# Patient Record
Sex: MALE | Race: WHITE | URBAN - METROPOLITAN AREA
[De-identification: names, ages, dates, MRNs, and addresses within clinical notes are randomized per-mention and may not be internally consistent; named-entity substitution may affect disease eponyms.]

---

## 2017-03-02 ENCOUNTER — OFFICE VISIT (OUTPATIENT)
Dept: FAMILY MEDICINE | Facility: CLINIC | Age: 52
End: 2017-03-02
Payer: COMMERCIAL

## 2017-03-02 VITALS
OXYGEN SATURATION: 97 % | WEIGHT: 180.06 LBS | HEIGHT: 75 IN | HEART RATE: 110 BPM | TEMPERATURE: 99 F | SYSTOLIC BLOOD PRESSURE: 126 MMHG | DIASTOLIC BLOOD PRESSURE: 78 MMHG | BODY MASS INDEX: 22.39 KG/M2

## 2017-03-02 DIAGNOSIS — K62.9 RECTAL LESION: Primary | ICD-10-CM

## 2017-03-02 DIAGNOSIS — Z12.11 SPECIAL SCREENING FOR MALIGNANT NEOPLASMS, COLON: ICD-10-CM

## 2017-03-02 PROCEDURE — 99213 OFFICE O/P EST LOW 20 MIN: CPT | Performed by: PHYSICIAN ASSISTANT

## 2017-03-02 NOTE — PROGRESS NOTES
SUBJECTIVE:                                                    Raphael Frazier is a 51 year old male who presents to clinic today for the following health issues:      Rectal problem      Duration: yesterday     Description:   Pain: YES-   Itching: prior to seeing blood doyle was itching     Accompanying signs and symptoms:   Blood in stool: YES  Changes in stool pattern: no     History (similar episodes/previous evaluation): None    Precipitating or alleviating factors: None    Therapies tried and outcome: preparation H for itching    All.TAMELA Vick          Problem list and histories reviewed & adjusted, as indicated.  Additional history: 50 y/o new to me male here with some rectal c/o.  He noticed some mild rectal pain and itch.  Was not bad, was just thinking it may have been hemorrhoid.  He did have a BM yesterday, where he did have some blood with wiping.  Pain has improved since then.  No blood in stool or in bowl, just on TP.  Physically he feels much better, he is mostly worried and anxious.      He does tend to sit on toilet longer than he need.  But no recent constipation.      He has never had any colorectal screening.  No colon issues in the family.      Patient Active Problem List   Diagnosis     CARDIOVASCULAR SCREENING; LDL GOAL LESS THAN 160     Tinea versicolor     Anxiety     History reviewed. No pertinent past surgical history.    Social History   Substance Use Topics     Smoking status: Never Smoker     Smokeless tobacco: Never Used     Alcohol use No     Family History   Problem Relation Age of Onset     Hypertension Father      Psychotic Disorder Father      Asthma Father      Hypertension Brother      Asthma Mother            Reviewed and updated as needed this visit by clinical staff       Reviewed and updated as needed this visit by Provider         ROS:  Constitutional, HEENT, cardiovascular, pulmonary, gi and gu systems are negative, except as otherwise noted.    OBJECTIVE:                "                                     /78 (BP Location: Right arm, Patient Position: Right side, Cuff Size: Adult Regular)  Pulse 110  Temp 99  F (37.2  C) (Tympanic)  Ht 6' 2.5\" (1.892 m)  Wt 180 lb 1 oz (81.7 kg)  SpO2 97%  BMI 22.81 kg/m2  Body mass index is 22.81 kg/(m^2).  GENERAL: alert and no distress  EYES: Eyes grossly normal to inspection  RESP: lungs clear to auscultation - no rales, rhonchi or wheezes  CV: regular rate and rhythm, normal S1 S2, no S3 or S4, no murmur, click or rub, no peripheral edema and peripheral pulses strong  RECTAL (male): small rectal lesion 11 o'clock.  There is pinpoint bleeding from.      Diagnostic Test Results:  none      ASSESSMENT/PLAN:                                                            1. Rectal lesion  Small bleeding lesion, differential includes hemorrhoid disease.  If this bleeding continues for the next couple of days, I would like him to follow up with colorectal for further evaluation.  - COLORECTAL SURGERY REFERRAL    2. Special screening for malignant neoplasms, colon  Patient is due for colonoscopy, if symptoms resolve, should get further screening done.  If symptoms persist or worsen, follow with colorectal.    - GASTROENTEROLOGY ADULT REF PROCEDURE ONLY    Patient is due for well exam, did encourage him to follow up with that later this year.    Tyrese Bailey PA-C  [facility]    "

## 2017-03-02 NOTE — NURSING NOTE
"No chief complaint on file.    /78 (BP Location: Right arm, Patient Position: Right side, Cuff Size: Adult Regular)  Pulse 110  Temp 99  F (37.2  C) (Tympanic)  Ht 6' 2.5\" (1.892 m)  Wt 180 lb 1 oz (81.7 kg)  SpO2 97%  BMI 22.81 kg/m2 Estimated body mass index is 22.81 kg/(m^2) as calculated from the following:    Height as of this encounter: 6' 2.5\" (1.892 m).    Weight as of this encounter: 180 lb 1 oz (81.7 kg).  bp completed using cuff size: regular      Health Maintenance addressed:  NONE    n/a                "

## 2017-03-02 NOTE — MR AVS SNAPSHOT
After Visit Summary   3/2/2017    Raphael Frazier    MRN: [id number]           Patient Information     Date Of Birth          1965        Visit Information        Provider Department      3/2/2017 2:20 PM Tyrese Bailey PA-C [facility]        Today's Diagnoses     Special screening for malignant neoplasms, colon    -  1    Rectal lesion           Follow-ups after your visit        Additional Services     COLORECTAL SURGERY REFERRAL       Your provider has referred you to: [facility] [phone]   [email or web address]    Referral Reason(s): Rectal Bleeding  Special Concerns: None  This referral is: Elective (week +)  It is OK to leave a message on patient's voicemail.    Please be aware that coverage of these services is subject to the terms and limitations of your health insurance plan.  Call member services at your health plan with any benefit or coverage questions.      Please bring the following with you to your appointment:    (1) Any X-Rays, CTs or MRIs which have been performed.  Contact the facility where they were done to arrange for  prior to your scheduled appointment.    (2) List of current medications  (3) This referral request   (4) Any documents/labs given to you for this referral            GASTROENTEROLOGY ADULT REF PROCEDURE ONLY       Last Lab Result: No results found for: CR  Body mass index is 22.81 kg/(m^2).     Needed:  No  Language:  English    Patient will be contacted to schedule procedure.     Please be aware that coverage of these services is subject to the terms and limitations of your health insurance plan.  Call member services at your health plan with any benefit or coverage questions.  Any procedures must be performed at a [place] facility OR coordinated by your clinic's referral office.    Please bring the following with you to your appointment:    (1) Any X-Rays, CTs or MRIs  "which have been performed.  Contact the facility where they were done to arrange for  prior to your scheduled appointment.    (2) List of current medications   (3) This referral request   (4) Any documents/labs given to you for this referral                  Who to contact     If you have questions or need follow up information about today's clinic visit or your schedule please contact [facility] directly at [phone].  Normal or non-critical lab and imaging results will be communicated to you by Measurablhart, letter or phone within 4 business days after the clinic has received the results. If you do not hear from us within 7 days, please contact the clinic through App[employer] or phone. If you have a critical or abnormal lab result, we will notify you by phone as soon as possible.  Submit refill requests through App[employer] or call your pharmacy and they will forward the refill request to us. Please allow 3 business days for your refill to be completed.          Additional Information About Your Visit        AppBrick Information     [employer] gives you secure access to your electronic health record. If you see a primary care provider, you can also send messages to your care team and make appointments. If you have questions, please call your primary care clinic.  If you do not have a primary care provider, please call [phone] and they will assist you.        Care EveryWhere ID     This is your Care EveryWhere ID. This could be used by other organizations to access your [place] medical records  [id number]        Your Vitals Were     Pulse Temperature Height Pulse Oximetry BMI (Body Mass Index)       110 99  F (37.2  C) (Tympanic) 6' 2.5\" (1.892 m) 97% 22.81 kg/m2        Blood Pressure from Last 3 Encounters:   03/02/17 126/78   06/16/14 110/76    Weight from Last 3 Encounters:   03/02/17 180 lb 1 oz (81.7 kg)   06/16/14 190 lb 14.4 oz (86.6 kg)              We Performed the Following     COLORECTAL " SURGERY REFERRAL     GASTROENTEROLOGY ADULT REF PROCEDURE ONLY        Primary Care Provider Office Phone # Fax #    Raphael Peng [phone]       [facility] [address] [address]        Thank you!     Thank you for choosing [facility]  for your care. Our goal is always to provide you with excellent care. Hearing back from our patients is one way we can continue to improve our services. Please take a few minutes to complete the written survey that you may receive in the mail after your visit with us. Thank you!             Your Updated Medication List - Protect others around you: Learn how to safely use, store and throw away your medicines at www.disposemymeds.org.          This list is accurate as of: 3/2/17  2:48 PM.  Always use your most recent med list.                   Brand Name Dispense Instructions for use    klonoPIN 1 MG tablet   Generic drug:  clonazePAM      Take 1 mg by mouth 2 times daily as needed for anxiety       XOPENEX IN

## 2019-12-15 ENCOUNTER — HEALTH MAINTENANCE LETTER (OUTPATIENT)
Age: 54
End: 2019-12-15

## 2021-01-15 ENCOUNTER — HEALTH MAINTENANCE LETTER (OUTPATIENT)
Age: 56
End: 2021-01-15

## 2021-09-04 ENCOUNTER — HEALTH MAINTENANCE LETTER (OUTPATIENT)
Age: 56
End: 2021-09-04

## 2022-02-19 ENCOUNTER — HEALTH MAINTENANCE LETTER (OUTPATIENT)
Age: 57
End: 2022-02-19

## 2022-10-22 ENCOUNTER — HEALTH MAINTENANCE LETTER (OUTPATIENT)
Age: 57
End: 2022-10-22

## 2023-04-01 ENCOUNTER — HEALTH MAINTENANCE LETTER (OUTPATIENT)
Age: 58
End: 2023-04-01